# Patient Record
Sex: FEMALE | Race: BLACK OR AFRICAN AMERICAN | Employment: FULL TIME | URBAN - METROPOLITAN AREA
[De-identification: names, ages, dates, MRNs, and addresses within clinical notes are randomized per-mention and may not be internally consistent; named-entity substitution may affect disease eponyms.]

---

## 2018-03-13 ENCOUNTER — HOSPITAL ENCOUNTER (EMERGENCY)
Age: 38
Discharge: HOME | End: 2018-03-13
Attending: EMERGENCY MEDICINE
Payer: COMMERCIAL

## 2018-03-13 ENCOUNTER — APPOINTMENT (OUTPATIENT)
Dept: ULTRASOUND IMAGING | Age: 38
End: 2018-03-13
Attending: EMERGENCY MEDICINE
Payer: COMMERCIAL

## 2018-03-13 VITALS
DIASTOLIC BLOOD PRESSURE: 71 MMHG | BODY MASS INDEX: 26.6 KG/M2 | OXYGEN SATURATION: 100 % | TEMPERATURE: 98.5 F | WEIGHT: 150.13 LBS | HEIGHT: 63 IN | SYSTOLIC BLOOD PRESSURE: 113 MMHG | RESPIRATION RATE: 16 BRPM | HEART RATE: 99 BPM

## 2018-03-13 DIAGNOSIS — R18.8 FREE FLUID IN PELVIS: ICD-10-CM

## 2018-03-13 DIAGNOSIS — D25.9 UTERINE LEIOMYOMA, UNSPECIFIED LOCATION: Primary | ICD-10-CM

## 2018-03-13 PROCEDURE — 74011250637 HC RX REV CODE- 250/637: Performed by: EMERGENCY MEDICINE

## 2018-03-13 PROCEDURE — 99283 EMERGENCY DEPT VISIT LOW MDM: CPT

## 2018-03-13 PROCEDURE — 76830 TRANSVAGINAL US NON-OB: CPT

## 2018-03-13 RX ORDER — IBUPROFEN AND FAMOTIDINE 26.6; 8 MG/1; MG/1
TABLET, FILM COATED ORAL
COMMUNITY

## 2018-03-13 RX ORDER — OXYCODONE AND ACETAMINOPHEN 5; 325 MG/1; MG/1
1 TABLET ORAL
Status: COMPLETED | OUTPATIENT
Start: 2018-03-13 | End: 2018-03-13

## 2018-03-13 RX ORDER — HYDROCODONE BITARTRATE AND ACETAMINOPHEN 5; 325 MG/1; MG/1
1 TABLET ORAL
Qty: 10 TAB | Refills: 0 | Status: SHIPPED | OUTPATIENT
Start: 2018-03-13

## 2018-03-13 RX ORDER — ONDANSETRON 4 MG/1
4 TABLET, FILM COATED ORAL
Qty: 20 TAB | Refills: 0 | Status: SHIPPED | OUTPATIENT
Start: 2018-03-13

## 2018-03-13 RX ORDER — ONDANSETRON 4 MG/1
4 TABLET, ORALLY DISINTEGRATING ORAL
Status: COMPLETED | OUTPATIENT
Start: 2018-03-13 | End: 2018-03-13

## 2018-03-13 RX ADMIN — OXYCODONE HYDROCHLORIDE AND ACETAMINOPHEN 1 TABLET: 5; 325 TABLET ORAL at 15:47

## 2018-03-13 RX ADMIN — ONDANSETRON 4 MG: 4 TABLET, ORALLY DISINTEGRATING ORAL at 16:46

## 2018-03-13 NOTE — ED PROVIDER NOTES
EMERGENCY DEPARTMENT HISTORY AND PHYSICAL EXAM      Date: 3/13/2018  Patient Name: America Cook    History of Presenting Illness     Chief Complaint   Patient presents with    Abdominal Pain     Onset reports onset lower abdominal pain yesterday. Pt hx of endometriosis. History Provided By: Patient    HPI: America Cook, 40 y.o. female with PMHx significant for endometriosis and uterine fibroid, presents ambulatory to the ED with cc of constant, sharp, 10/10 lower abdominal pain that radiates into BLE and nausea x 2 days. Pt states current presentation is consistent with hx of endometriosis. She states she is followed by gynecology in [place] and written for [facility], which she endorses taking wnr of sx's. Per chart review, pt had endometriosis excision in 3/2015 at [address] and states she had temporary relief afterwards. She notes she had another excision in [place] in 12/2017. Pt explains it has been \"a while\" since her last transvaginal US and last saw her gynecologist ~1/2018. Pt states her menstrual cycle started yesterday, however denies any recent abnormal vaginal bleeding or vaginal discharge. She specifically denies any dysuria, constipation, diarrhea, vomiting, and fever. PCP: None    There are no other complaints, changes, or physical findings at this time. Current Outpatient Prescriptions   Medication Sig Dispense Refill    ibuprofen-famotidine (DUEXIS) 800-26.6 mg tab Take  by mouth.  HYDROcodone-acetaminophen (NORCO) 5-325 mg per tablet Take 1 Tab by mouth every six (6) hours as needed for Pain. Max Daily Amount: 4 Tabs. 10 Tab 0    ondansetron hcl (ZOFRAN, AS HYDROCHLORIDE,) 4 mg tablet Take 1 Tab by mouth every eight (8) hours as needed for Nausea. 20 Tab 0    albuterol (PROVENTIL HFA, VENTOLIN HFA, PROAIR HFA) 90 mcg/actuation inhaler Take 2 Puffs by inhalation every six (6) hours as needed for Wheezing.          Past History     Past Medical History:  Past Medical History: Diagnosis Date    Asthma     Endometriosis        Past Surgical History:  Past Surgical History:   Procedure Laterality Date    HX GYN      x1 vaginal birth   [de-identified] MYOMECTOMY         Family History:  History reviewed. No pertinent family history. Social History:  Social History   Substance Use Topics    Smoking status: Never Smoker    Smokeless tobacco: Never Used    Alcohol use No       Allergies:  No Known Allergies      Review of Systems   Review of Systems   Constitutional: Negative for chills and fever. Respiratory: Negative for cough and shortness of breath. Cardiovascular: Negative for chest pain. Gastrointestinal: Positive for abdominal pain and nausea. Negative for constipation, diarrhea and vomiting. Genitourinary: Negative for dysuria, vaginal bleeding and vaginal discharge. Musculoskeletal: Positive for myalgias (BLE). Neurological: Negative for weakness and numbness. All other systems reviewed and are negative. Physical Exam   Physical Exam   Constitutional: She is oriented to person, place, and time. She appears well-developed and well-nourished. HENT:   Head: Normocephalic and atraumatic. Eyes: Conjunctivae and EOM are normal.   Neck: Normal range of motion. Neck supple. Cardiovascular: Normal rate and regular rhythm. Pulmonary/Chest: Effort normal and breath sounds normal. No respiratory distress. Abdominal: Soft. She exhibits no distension. Minimal tenderness in suprapubic area   Musculoskeletal: Normal range of motion. Neurological: She is alert and oriented to person, place, and time. Skin: Skin is warm and dry. Psychiatric: She has a normal mood and affect. Nursing note and vitals reviewed.         Diagnostic Study Results     Radiologic Studies -   US TRANSVAGINAL   Final Result     TRANSVAGINAL PELVIC ULTRASOUND WITH PELVIC DOPPLER. 3/13/2018 4:29 PM     INDICATION: Pain, history of endometriosis.     COMPARISON: None.     TECHNIQUE: Grayscale, color, and Doppler ultrasound imaging of the pelvis was  performed transvaginally. No transabdominal ultrasound was ordered or performed.     FINDINGS:  The uterus measures 86 x 58 x 67 mm. An intramural fibroid at the right aspect  of the fundus measures 35 x 32 x 33 mm. There is probably an additional,  smaller, intramural fibroid immediately adjacent to this extending along the  right anterior wall. The endometrium measures 14 mm and is of normal thickness  for secretory phase in a premenopausal female. Its margins are not well  visualized, at least in part due to ratio and from the fibroids.      The ovaries are normal in size and echotexture. The bilateral normal Doppler  flow does not exclude torsion, however, the likelihood is very low given the  greyscale appearance of the ovaries. The right ovary measures 35 x 23 x 31 mm  and the left ovary measures 25 x 21 x 27 mm. A small volume of free pelvic fluid  has some internal echoes, suggesting protein or hemorrhage. This may be within  the limits of physiologic for secretory phase.     IMPRESSION  IMPRESSION:   1. Small volume of complex (possible protein or hemorrhage) pelvic fluid may be  within the limits of normal physiologic fluid for secretory phase. 2.  Intramural fibroids. Medical Decision Making   I am the first provider for this patient. I reviewed the vital signs, available nursing notes, past medical history, past surgical history, family history and social history. Vital Signs-Reviewed the patient's vital signs.   Patient Vitals for the past 12 hrs:   Temp Pulse Resp BP SpO2   03/13/18 1700 - - - 113/71 100 %   03/13/18 1539 - - - - 99 %   03/13/18 1538 - - - 113/82 -   03/13/18 1452 98.5 °F (36.9 °C) 99 16 125/76 100 %       Pulse Oximetry Analysis - 100% on RA    Cardiac Monitor:   Rate: 99 bpm  Rhythm: Normal Sinus Rhythm      Records Reviewed: Nursing Notes, Old Medical Records, Previous Radiology Studies and Previous Laboratory Studies    Provider Notes (Medical Decision Making):     Patient presents with groin and pelvic pain. Most likely pt's endometriosis. DDx: pregnancy, ectopic pregnancy, ovarian torsion, STI, UTI, ovarian cyst, fibroids, cancer. Will obtain labs, UA, UPT, transvaginal US, and pain control. ED Course:   Initial assessment performed. The patients presenting problems have been discussed, and they are in agreement with the care plan formulated and outlined with them. I have encouraged them to ask questions as they arise throughout their visit. Disposition:    DISCHARGE NOTE:  4:45 PM  The patient is ready for discharge. The patients signs, symptoms, diagnosis, and instructions for discharge have been discussed and the pt has conveyed their understanding. The patient is to follow up as recommended with PCP or OB/GYN or return to the ER should their symptoms worsen. Plan has been discussed and patient has conveyed their agreement. PLAN:  1. Discharge home   Discharge Medication List as of 3/13/2018  4:44 PM      START taking these medications    Details   HYDROcodone-acetaminophen (NORCO) 5-325 mg per tablet Take 1 Tab by mouth every six (6) hours as needed for Pain. Max Daily Amount: 4 Tabs., Print, Disp-10 Tab, R-0      ondansetron hcl (ZOFRAN, AS HYDROCHLORIDE,) 4 mg tablet Take 1 Tab by mouth every eight (8) hours as needed for Nausea., Normal, Disp-20 Tab, R-0         CONTINUE these medications which have NOT CHANGED    Details   ibuprofen-famotidine (DUEXIS) 800-26.6 mg tab Take  by mouth., Historical Med      albuterol (PROVENTIL HFA, VENTOLIN HFA, PROAIR HFA) 90 mcg/actuation inhaler Take 2 Puffs by inhalation every six (6) hours as needed for Wheezing., Historical Med           2.    Follow-up Information     Follow up With Details Comments Contact Info    Your Gynecologist Schedule an appointment as soon as possible for a visit          Return to ED if worse     Diagnosis     Clinical Impression:   1. Uterine leiomyoma, unspecified location    2. Free fluid in pelvis        Attestations: This note is prepared by Noeim Pinto, acting as Scribe for Megan Angelo M.D. Megan Angelo M.D: The scribe's documentation has been prepared under my direction and personally reviewed by me in its entirety. I confirm that the note above accurately reflects all work, treatment, procedures, and medical decision making performed by me.

## 2018-03-13 NOTE — ED TRIAGE NOTES
Assumed care of this patient. She is alert and oriented x4. Placed on monitor x2. Patient reports that she is visiting from out of town and is experiencing lower pelvic pain since yesterday. She states that this pain feels like her endometriosis. She takes Ibuprofen-Famotidine for pain but has had no relief. Last dose was at 1100.

## 2018-03-13 NOTE — ED NOTES
Patient discharged by Dr. Orion Guillen. Patient provided with discharge instructions Rx and instructions on follow up care. Patient out of ED ambulatory accompanied by self.

## 2019-09-11 ENCOUNTER — APPOINTMENT (RX ONLY)
Dept: URBAN - METROPOLITAN AREA CLINIC 34 | Facility: CLINIC | Age: 39
Setting detail: DERMATOLOGY
End: 2019-09-11

## 2019-09-11 DIAGNOSIS — L20.89 OTHER ATOPIC DERMATITIS: ICD-10-CM

## 2019-09-11 DIAGNOSIS — R21 RASH AND OTHER NONSPECIFIC SKIN ERUPTION: ICD-10-CM

## 2019-09-11 PROBLEM — J45.909 UNSPECIFIED ASTHMA, UNCOMPLICATED: Status: ACTIVE | Noted: 2019-09-11

## 2019-09-11 PROBLEM — L20.84 INTRINSIC (ALLERGIC) ECZEMA: Status: ACTIVE | Noted: 2019-09-11

## 2019-09-11 PROCEDURE — ? PRESCRIPTION

## 2019-09-11 PROCEDURE — ? ORDER TESTS

## 2019-09-11 PROCEDURE — ? COUNSELING

## 2019-09-11 PROCEDURE — 99202 OFFICE O/P NEW SF 15 MIN: CPT

## 2019-09-11 PROCEDURE — ? ADDITIONAL NOTES

## 2019-09-11 RX ORDER — SODIUM SULFACETAMIDE, SULFUR 98; 48 MG/G; MG/G
SOLUTION TOPICAL
Qty: 1 | Refills: 0 | Status: ERX | COMMUNITY
Start: 2019-09-11

## 2019-09-11 RX ORDER — PIMECROLIMUS 10 MG/G
CREAM TOPICAL
Qty: 1 | Refills: 1 | Status: ERX | COMMUNITY
Start: 2019-09-11

## 2019-09-11 RX ORDER — METRONIDAZOLE 10 MG/G
GEL TOPICAL
Qty: 1 | Refills: 0 | Status: ERX | COMMUNITY
Start: 2019-09-11

## 2019-09-11 RX ADMIN — SODIUM SULFACETAMIDE, SULFUR: 98; 48 SOLUTION TOPICAL at 19:31

## 2019-09-11 RX ADMIN — METRONIDAZOLE: 10 GEL TOPICAL at 19:29

## 2019-09-11 RX ADMIN — PIMECROLIMUS: 10 CREAM TOPICAL at 19:27

## 2019-09-11 ASSESSMENT — LOCATION SIMPLE DESCRIPTION DERM
LOCATION SIMPLE: LEFT LIP
LOCATION SIMPLE: RIGHT CHEEK
LOCATION SIMPLE: LEFT CHEEK
LOCATION SIMPLE: CHIN
LOCATION SIMPLE: RIGHT LIP

## 2019-09-11 ASSESSMENT — LOCATION DETAILED DESCRIPTION DERM
LOCATION DETAILED: RIGHT INFERIOR CENTRAL MALAR CHEEK
LOCATION DETAILED: LEFT LOWER CUTANEOUS LIP
LOCATION DETAILED: RIGHT MENTAL CREASE
LOCATION DETAILED: RIGHT LOWER CUTANEOUS LIP
LOCATION DETAILED: LEFT INFERIOR CENTRAL MALAR CHEEK
LOCATION DETAILED: LEFT CHIN

## 2019-09-11 ASSESSMENT — LOCATION ZONE DERM
LOCATION ZONE: LIP
LOCATION ZONE: FACE

## 2019-09-11 NOTE — PROCEDURE: ADDITIONAL NOTES
Detail Level: Simple
Additional Notes: Picrolimus daily to affected areas on chin
Additional Notes: Cetaphil [id number] sample

## 2019-09-11 NOTE — HPI: RASH
What Type Of Note Output Would You Prefer (Optional)?: Standard Output
How Severe Is Your Rash?: mild
Is This A New Presentation, Or A Follow-Up?: Rash
Additional History: Pt presents with irritated skin on face, states it worsens with sunlight.

## 2019-12-10 ENCOUNTER — RX ONLY (OUTPATIENT)
Age: 39
Setting detail: RX ONLY
End: 2019-12-10

## 2019-12-10 RX ORDER — SULFACETAMIDE SODIUM, SULFUR 98; 48 MG/G; MG/G
LIQUID TOPICAL
Qty: 1 | Refills: 1 | Status: ERX

## 2019-12-17 ENCOUNTER — APPOINTMENT (RX ONLY)
Dept: URBAN - METROPOLITAN AREA CLINIC 34 | Facility: CLINIC | Age: 39
Setting detail: DERMATOLOGY
End: 2019-12-17

## 2019-12-17 DIAGNOSIS — R21 RASH AND OTHER NONSPECIFIC SKIN ERUPTION: ICD-10-CM

## 2019-12-17 PROCEDURE — ? ADDITIONAL NOTES

## 2019-12-17 PROCEDURE — 99213 OFFICE O/P EST LOW 20 MIN: CPT

## 2019-12-17 PROCEDURE — ? TREATMENT REGIMEN

## 2019-12-17 ASSESSMENT — LOCATION SIMPLE DESCRIPTION DERM
LOCATION SIMPLE: LEFT CHEEK
LOCATION SIMPLE: RIGHT CHEEK

## 2019-12-17 ASSESSMENT — LOCATION DETAILED DESCRIPTION DERM
LOCATION DETAILED: RIGHT INFERIOR CENTRAL MALAR CHEEK
LOCATION DETAILED: LEFT INFERIOR CENTRAL MALAR CHEEK

## 2019-12-17 ASSESSMENT — LOCATION ZONE DERM: LOCATION ZONE: FACE

## 2019-12-17 NOTE — PROCEDURE: ADDITIONAL NOTES
Detail Level: Simple
Additional Notes: CR advises to continue regimen.  CR also advised to get bloodwork to rule out systemic disease

## 2020-02-10 ENCOUNTER — RX ONLY (OUTPATIENT)
Age: 40
Setting detail: RX ONLY
End: 2020-02-10

## 2020-02-10 RX ORDER — SULFACETAMIDE SODIUM, SULFUR 98; 48 MG/G; MG/G
LIQUID TOPICAL
Qty: 1 | Refills: 1 | Status: ERX